# Patient Record
(demographics unavailable — no encounter records)

---

## 2025-04-21 NOTE — REVIEW OF SYSTEMS
[Adenopathy] : adenopathy [Frequent Infections] : frequent infections [Negative] : Allergic/Immunologic [Immunizations are up to date by report] : Immunizations are up to date by report [Rash] : no rash [Petechiae] : no petechiae [Ecchymoses] : no ecchymoses [Eczema] : no eczema [Icterus] : no icterus [Epistaxis] : no epistaxis [Mouth Ulcers] : no mouth ulcers [Pallor] : no pallor [Bleeding] : no bleeding [Bruising] : no bruising [Anemia] : no anemia

## 2025-04-21 NOTE — CONSULT LETTER
[Dear  ___] : Dear  [unfilled], [Courtesy Letter:] : I had the pleasure of seeing your patient, [unfilled], in my office today. [Please see my note below.] : Please see my note below. [Consult Closing:] : Thank you very much for allowing me to participate in the care of this patient.  If you have any questions, please do not hesitate to contact me. [Sincerely,] : Sincerely, [FreeTextEntry2] : Dr Hernandez [FreeTextEntry3] : Jesus Ibanez MD Pediatric Hematology/Oncology [place] [id number]

## 2025-04-21 NOTE — PAST MEDICAL HISTORY
[At Term] : at term [place] [Normal Vaginal Route] : by normal vaginal route [None] : there were no delivery complications [Age Appropriate] : age appropriate  [In Vitro Fertilization] : Pregnancy no in vitro fertilization [Jaundice] : not jaundice [Phototherapy] : no phototherapy [Transfusion] : no transfusion [NICU] : no NICU

## 2025-04-21 NOTE — END OF VISIT
[FreeTextEntry3] : Patient seen and examined.  Family described right cervical nodes that increase in size with illness and decrease after resolution of illness but are still palpable.  Recently enlarged and now have decreased while on antibiotics.  Ultrasound was performed at an outside facility and report favors reactive nodes.  Discussed that the nodes have been decreasing in size on antibiotics is reassuring.  However, family is in agreement with initial lab evaluation today.  CBC, CMP, ESR, CRP, LDH, Uric acid, REGLA and ALPS panel sent today.  F/u to be determined by clinical status and initial lab eval. [Time Spent: ___ minutes] : I have spent [unfilled] minutes of time on the encounter which excludes teaching and separately reported services.

## 2025-04-21 NOTE — HISTORY OF PRESENT ILLNESS
[de-identified] : 4 y/o male accompanied by parents for enlarged lymph nodes.  US was done on 4/10/25. Mother reports right neck lymph nodes have been enlarged for about a year and gets bigger during times of illness, then decreases in size when illness resolves.  2.5 weeks ago, runny nose and nasal congestion causing mouth breathing, bilateral acute otitis media and right eye conjunctivitis.  No fever.  Strep negative. Started on amoxicillin 500mg po BID for 10 days, will complete course tomorrow.  Parents report several viral illness since December 2024, rsv, possible hand foot and mouth.  Denies bruising or epistaxis.  Denies n/v/diarrhea.  Bowel movements daily, stool hard, mother gives magnesium to help assist bowel movements.  Denies dizziness, headaches, or activity intolerance.  No exertional dyspnea.  Eats meat, chicken, eggs, pasta, limited fruit and vegetables.  Drinks water with apple cider added throughout day. No difficulties with urination.  Sleeps well at night.  Active and alert, playful. Denies mouth sores. Denies daily medications.  Takes daily fish oil, mvi, vit c, mg and elderberry syrup.  Not circumcised.  Umbilical cord healed without complication.  Drinks lactose free milk ~ 7-8 oz daily. Cat allergies. [FreeTextEntry3] : only for a few weeks after delivery

## 2025-04-21 NOTE — HISTORY OF PRESENT ILLNESS
[de-identified] : 4 y/o male accompanied by parents for enlarged lymph nodes.  US was done on 4/10/25. Mother reports right neck lymph nodes have been enlarged for about a year and gets bigger during times of illness, then decreases in size when illness resolves.  2.5 weeks ago, runny nose and nasal congestion causing mouth breathing, bilateral acute otitis media and right eye conjunctivitis.  No fever.  Strep negative. Started on amoxicillin 500mg po BID for 10 days, will complete course tomorrow.  Parents report several viral illness since December 2024, rsv, possible hand foot and mouth.  Denies bruising or epistaxis.  Denies n/v/diarrhea.  Bowel movements daily, stool hard, mother gives magnesium to help assist bowel movements.  Denies dizziness, headaches, or activity intolerance.  No exertional dyspnea.  Eats meat, chicken, eggs, pasta, limited fruit and vegetables.  Drinks water with apple cider added throughout day. No difficulties with urination.  Sleeps well at night.  Active and alert, playful. Denies mouth sores. Denies daily medications.  Takes daily fish oil, mvi, vit c, mg and elderberry syrup.  Not circumcised.  Umbilical cord healed without complication.  Drinks lactose free milk ~ 7-8 oz daily. Cat allergies. [FreeTextEntry3] : only for a few weeks after delivery

## 2025-04-21 NOTE — PHYSICAL EXAM
[Cervical Lymph Nodes Enlarged Posterior Bilaterally] : posterior cervical [Supraclavicular Lymph Nodes Enlarged Bilaterally] : supraclavicular [No focal deficits] : no focal deficits [Gait normal] : gait normal [Normal] : affect appropriate [de-identified] : Unable to visualize pharyngeal area -child movement, would  not allow exam, lips, tongue and visualized mucus membranes moist and pink [de-identified] : right anterior cervical lymph enlargement measuring 2.4cm length and 2 cm width, normal anterior cervical lymph nodes on left side neck

## 2025-04-21 NOTE — CONSULT LETTER
[Dear  ___] : Dear  [unfilled], [Courtesy Letter:] : I had the pleasure of seeing your patient, [unfilled], in my office today. [Please see my note below.] : Please see my note below. [Consult Closing:] : Thank you very much for allowing me to participate in the care of this patient.  If you have any questions, please do not hesitate to contact me. [Sincerely,] : Sincerely, [FreeTextEntry2] : Dr Hernandez [FreeTextEntry3] : Jesus Ibanez MD Pediatric Hematology/Oncology [place] [phone]

## 2025-04-21 NOTE — REASON FOR VISIT
[New Patient/Consultation] : a new patient/consultation for [Parents] : parents [FreeTextEntry2] : Lymph nodes enlarged

## 2025-04-21 NOTE — PHYSICAL EXAM
[Cervical Lymph Nodes Enlarged Posterior Bilaterally] : posterior cervical [Supraclavicular Lymph Nodes Enlarged Bilaterally] : supraclavicular [No focal deficits] : no focal deficits [Gait normal] : gait normal [Normal] : affect appropriate [de-identified] : Unable to visualize pharyngeal area -child movement, would  not allow exam, lips, tongue and visualized mucus membranes moist and pink [de-identified] : right anterior cervical lymph enlargement measuring 2.4cm length and 2 cm width, normal anterior cervical lymph nodes on left side neck

## 2025-05-28 NOTE — HISTORY OF PRESENT ILLNESS
Patient has been seen in the last 6 months by clinical staff so refill approved [No Feeding Issues] : no feeding issues at this time [de-identified] : This is a scheduled follow up visit for this 6 y/o male with enlarged cervical lymph nodes.  Mother reports that Daquan has been consistently sick since last clinic visit.  States that he had been seen by both ENT and PMD several times due to possible sinusitis and allergy symptoms and is currently on amoxicillin for right OM.  Denies fever.  States that she feels that lymph nodes have gotten smaller since last clinic visit.  Continues to have a good appetite and good energy level.  No other concerns noted.

## 2025-05-28 NOTE — CONSULT LETTER
[Dear  ___] : Dear  [unfilled], [Courtesy Letter:] : I had the pleasure of seeing your patient, [unfilled], in my office today. [Please see my note below.] : Please see my note below. [Consult Closing:] : Thank you very much for allowing me to participate in the care of this patient.  If you have any questions, please do not hesitate to contact me. [Sincerely,] : Sincerely, [FreeTextEntry2] : Dr Hernandez [FreeTextEntry3] : Jesus Ibanez MD Pediatric Hematology/Oncology [place] [phone]

## 2025-05-28 NOTE — HISTORY OF PRESENT ILLNESS
[No Feeding Issues] : no feeding issues at this time [de-identified] : This is a scheduled follow up visit for this 4 y/o male with enlarged cervical lymph nodes.  Mother reports that Daquan has been consistently sick since last clinic visit.  States that he had been seen by both ENT and PMD several times due to possible sinusitis and allergy symptoms and is currently on amoxicillin for right OM.  Denies fever.  States that she feels that lymph nodes have gotten smaller since last clinic visit.  Continues to have a good appetite and good energy level.  No other concerns noted.

## 2025-05-28 NOTE — PHYSICAL EXAM
[Gait normal] : gait normal [Normal] : affect appropriate [de-identified] : Right anterior cervical lymph node [id number] with smaller lymph node slightly below measuring [id number] (soft, mobile and nontender)  left anterior cervical lymph nodes noted - small mobile and non tender

## 2025-05-28 NOTE — END OF VISIT
[FreeTextEntry3] : Attending attestation (Dr Tavera): 6 yo who presented for eval of enlarged lymph nodes.  Right sided neck nodes enalrged for a year per mother, but seem reactive to infections and get bigger and smaller.  Ultrasound showed edematous nodes that were likely reactive.  Suspected reactice nodes.  Discussed options with mother and opted to send initial lab eval at last visit.  CBC and LDH were normal.  REGLA negative.  ALPS screen was equivocal.  Discussed recommendation to repeat the ALPS testing beyond the screening test, which I would send to an outside lab.  Mother preferred to go to a separate lab on another day.  Discussed with mother concerns that send-out labs may not get to appropriate lab in a timely manner, but she preferred the option to drawn labs at her preferred facility on another day.  Will f/u as needed based on lab results.  If ALPS testing is negative, then current nodes evaluated are likely reactive nodes and no further workup indicated at this time.  f/u heme as needed in the future with any future concerns regarding the morphology/exam of nodes, perisistently enlarged/enlarging nodes or other clinical or lab concerns.  >50% encounter by attending. [Time Spent: ___ minutes] : I have spent [unfilled] minutes of time on the encounter which excludes teaching and separately reported services.

## 2025-05-28 NOTE — REVIEW OF SYSTEMS
[Otitis Media] : otitis media [Nasal Discharge] : nasal discharge [Fever] : no fever [Decreased Appetite] : normal appetite [Fatigue] : no fatigue [Weakness] : no weakness [Rash] : no rash [Petechiae] : no petechiae [Jaundice] : no jaundice [Icterus] : no icterus [Epistaxis] : no epistaxis [Sore Throat] : no sore throat [Mouth Ulcers] : no mouth ulcers [Pallor] : no pallor [Bleeding] : no bleeding [Bruising] : no bruising [Anemia] : no anemia [Dyspnea] : no dyspnea [Cough] : no cough [Wheezing] : no wheezing [Stridor] : no stridor [Murmur] : no murmur [Chest Pain] : no chest pain [Abdominal Pain] : no abdominal pain [Nausea] : no nausea [Emesis] : no emesis [Constipation] : no constipation [Diarrhea] : no diarrhea [Dysuria] : no dysuria [Hematuria] : no hematuria [Joint Pain] : no joint pain [Joint Swelling] : no joint swelling [Myalgia] : no myalgia [Headache] : no headache [Dizziness] : no dizziness [Ramirez] : not ramirez [Irritable] : not irritable

## 2025-05-28 NOTE — END OF VISIT
[FreeTextEntry3] : Attending attestation (Dr Tavera): 4 yo who presented for eval of enlarged lymph nodes.  Right sided neck nodes enalrged for a year per mother, but seem reactive to infections and get bigger and smaller.  Ultrasound showed edematous nodes that were likely reactive.  Suspected reactice nodes.  Discussed options with mother and opted to send initial lab eval at last visit.  CBC and LDH were normal.  REGLA negative.  ALPS screen was equivocal.  Discussed recommendation to repeat the ALPS testing beyond the screening test, which I would send to an outside lab.  Mother preferred to go to a separate lab on another day.  Discussed with mother concerns that send-out labs may not get to appropriate lab in a timely manner, but she preferred the option to drawn labs at her preferred facility on another day.  Will f/u as needed based on lab results.  If ALPS testing is negative, then current nodes evaluated are likely reactive nodes and no further workup indicated at this time.  f/u heme as needed in the future with any future concerns regarding the morphology/exam of nodes, perisistently enlarged/enlarging nodes or other clinical or lab concerns.  >50% encounter by attending. [Time Spent: ___ minutes] : I have spent [unfilled] minutes of time on the encounter which excludes teaching and separately reported services.

## 2025-05-28 NOTE — REASON FOR VISIT
[Follow-Up Visit] : a follow-up visit for [Mother] : mother [FreeTextEntry2] : enlarged cervical lymph nodes

## 2025-05-28 NOTE — PHYSICAL EXAM
[Gait normal] : gait normal [Normal] : affect appropriate [de-identified] : Right anterior cervical lymph node [id number] with smaller lymph node slightly below measuring [id number] (soft, mobile and nontender)  left anterior cervical lymph nodes noted - small mobile and non tender

## 2025-05-28 NOTE — CONSULT LETTER
[Dear  ___] : Dear  [unfilled], [Courtesy Letter:] : I had the pleasure of seeing your patient, [unfilled], in my office today. [Please see my note below.] : Please see my note below. [Consult Closing:] : Thank you very much for allowing me to participate in the care of this patient.  If you have any questions, please do not hesitate to contact me. [Sincerely,] : Sincerely, [FreeTextEntry2] : Dr Hernandez [FreeTextEntry3] : Jesus Ibanez MD Pediatric Hematology/Oncology [place] [phone]

## 2025-05-28 NOTE — HISTORY OF PRESENT ILLNESS
[No Feeding Issues] : no feeding issues at this time [de-identified] : This is a scheduled follow up visit for this 6 y/o male with enlarged cervical lymph nodes.  Mother reports that Daquan has been consistently sick since last clinic visit.  States that he had been seen by both ENT and PMD several times due to possible sinusitis and allergy symptoms and is currently on amoxicillin for right OM.  Denies fever.  States that she feels that lymph nodes have gotten smaller since last clinic visit.  Continues to have a good appetite and good energy level.  No other concerns noted.

## 2025-05-28 NOTE — PHYSICAL EXAM
[Gait normal] : gait normal [Normal] : affect appropriate [de-identified] : Right anterior cervical lymph node [id number] with smaller lymph node slightly below measuring [id number] (soft, mobile and nontender)  left anterior cervical lymph nodes noted - small mobile and non tender